# Patient Record
(demographics unavailable — no encounter records)

---

## 2024-11-11 NOTE — IMAGING
[de-identified] : RIGHT HAND superficial abrasion to volar wrist. no erythema or drainage. TTP to distal radius and ulna. non-tender to elbow. good EPL, FPL. good finger extension, flex to full fist. good finger abduction and adduction.  SILT to median, ulnar, radial distribution.  palpable radial pulse, brisk cap refill all digits. no triggering.  I independently reviewed and interpreted outside XRAYS OF RIGHT WRIST (3 views - PA, OBLIQUE, AND LATERAL VIEWS): displaced distal radius metaphyseal buckle fx with mild dorsal angulation. non-displaced distal ulnar neck fx. open physes.

## 2024-11-11 NOTE — ASSESSMENT
[FreeTextEntry1] : The condition was explained to the patient and her mother. Fracture alignment within acceptable limits. Plan to proceed with non-operative treatment.   We discussed that although there may be some imperfect alignment on X-ray, good function is more important than good X-rays, and that performing surgery may lead to unnecessary scar tissue formation. We did discuss the goals of surgery when indicated for malrotation or significant angulation/displacement and what to expect. Moreover, I anticipate that the alignment of the fracture will improve as the fracture remodels.  Risks of treatment include, but are not limited to persistent pain, stiffness, fracture displacement, post-traumatic arthritis, physeal arrest, need for future surgery, mal-union, non-union.   - I personally applied a fiberglass short arm cast. reviewed cast care instructions. Reviewed signs/symptoms of compartment syndrome that would warrant emergent evaluation, including severe swelling, worsening pain, persistent numbness/tingling that does not resolve with elevation, pale/white/cold fingers. Reviewed cast care instructions - do not remove cast, do not get cast wet, do not put objects down cast. We discussed that there is a moderate risk of complications and morbidity with casting, including skin burn, skin irritation, skin breakdown, and stiffness from immobilization. They expressed understanding. - encouraged HEP (joint blocking exercises, making a fist) 3-5x/day to reduce finger stiffness. - elevate hand above level of heart as much as possible to reduce swelling. - recommend ice and OTC pain medications such as Tylenol and NSAIDs as needed. reviewed contra-indicated medical conditions (eg liver disease, kidney disease, or GI ulcer/bleeding) or medications (eg blood thinners). Discussed possible GI and blood pressure side effects. - NWB to R hand. no gym/sports.   F/u 4wks for cast removal. X-rays of R wrist out of cast.

## 2024-11-11 NOTE — HISTORY OF PRESENT ILLNESS
[de-identified] : 11/11/24: HPI obtained from patient's parent as independent historian due to patient's age making them an unreliable historian. 7yo female (RHD) presents with mother for RIGHT wrist pain after falling off monkeybars on 11/9/24. Went to [facility] ER in [place] on DOI => XR showed fx, splint. +Tylenol. Motrin  PMH: none [FreeTextEntry5] : KAROLINA feliz [RHD] 6 year old female is here today for RIGHT wrist evaluation after falling off of the monkey bars while in [place] on 11/09/24. pt went to [facility]'s ED +xrays, splint and sling. taking Tylenol and Motrin PRN pain. denies prior injury to wrist.

## 2025-01-31 NOTE — HISTORY OF PRESENT ILLNESS
[de-identified] : fever [FreeTextEntry6] : 6 year old with fever and emesis x 1 today some cough nasal congestion sibs with FLU A Not vaccinated

## 2025-01-31 NOTE — REVIEW OF SYSTEMS
[Fever] : fever [Cough] : cough [Negative] : Genitourinary Avoid lifting with arms    NO work three days.     Follow up with your doctor if pain persists.     Take tylenol as needed.

## 2025-01-31 NOTE — DISCUSSION/SUMMARY
[FreeTextEntry1] : Child's presentation is most consistent with the flu. Therefore, I advised patient to remain home for 24 hours beyond last fever. Reviewed strict hand washing to prevent spread of the flu.  Add Tamiflu  Recommended yearly flu vaccine, discussed benefits of vaccine, side effects of the vaccine, and risks of not receiving the vaccine including the increased risk of von the illness, increased risks of secondary infections, hospitalizations, and even death.

## 2025-03-24 NOTE — HISTORY OF PRESENT ILLNESS
[de-identified] : SOR THROAT [FreeTextEntry6] : c/o sore throat and aches stuffy nose and slight cough exposed to strep and flu at school no fever sx 1 d

## 2025-03-24 NOTE — DISCUSSION/SUMMARY
[FreeTextEntry1] : RAPID STREP NEG TC SENT TO LAB  RAPID FLU NEG A NEG B  Use humidifier, saline nasal drops, encourage fluids and fever control as needed. Elevate head of bed. Return for spiking fever, worsening symptoms, respiratory distress or concerns.

## 2025-04-02 NOTE — DISCUSSION/SUMMARY
[Normal Growth] : growth [Normal Development] : development [None] : No known medical problems [No Elimination Concerns] : elimination [No Feeding Concerns] : feeding [No Skin Concerns] : skin [Normal Sleep Pattern] : sleep [No Medications] : ~He/She~ is not on any medications [Patient] : patient [School] : school [Development and Mental Health] : development and mental health [Nutrition and Physical Activity] : nutrition and physical activity [Oral Health] : oral health [Safety] : safety [Full Activity without restrictions including Physical Education & Athletics] : Full Activity without restrictions including Physical Education & Athletics [] : The components of the vaccine(s) to be administered today are listed in the plan of care. The disease(s) for which the vaccine(s) are intended to prevent and the risks have been discussed with the caretaker.  The risks are also included in the appropriate vaccination information statements which have been provided to the patient's caregiver.  The caregiver has given consent to vaccinate. [FreeTextEntry1] : FLU - DECLINED  HEARING NL  VISION SEE OPHTO  UA IN OFFICE  CBC RX GIVEN  TB risk assessment completed- no risk for TB. PPD not required  Discussed safety/feeding/sleep as appropriate for age.  Time allowed for questions and all answered with understanding.

## 2025-04-02 NOTE — HISTORY OF PRESENT ILLNESS
[Mother] : mother [Normal] : Normal [Brushing teeth twice/d] : brushing teeth twice per day [Yes] : Patient goes to dentist yearly [Vitamin] : Primary Fluoride Source: Vitamin [Playtime (60 min/d)] : playtime 60 min a day [Grade ___] : Grade [unfilled] [No] : No cigarette smoke exposure [Appropriately restrained in motor vehicle] : appropriately restrained in motor vehicle [Supervised outdoor play] : supervised outdoor play [Supervised around water] : supervised around water [Wears helmet and pads] : wears helmet and pads [Parent knows child's friends] : parent knows child's friends [Monitored computer use] : monitored computer use [Family discusses home emergency plan] : family discusses home emergency plan [Up to date] : Up to date [NO] : No [Exposure to electronic nicotine delivery system] : No exposure to electronic nicotine delivery system [FreeTextEntry7] : 8 YO WELL EXAM [de-identified] : doing well [de-identified] : good diet

## 2025-06-18 NOTE — HISTORY OF PRESENT ILLNESS
[de-identified] : rash [FreeTextEntry6] : rash noted past 24 hours started on cheeks now on arms mild fever yest no other sx

## 2025-06-18 NOTE — PHYSICAL EXAM
[NL] : moves all extremities x4, warm, well perfused x4 [de-identified] : lacy erythematous rash under skin arms thighs

## 2025-07-24 NOTE — PHYSICAL EXAM
[de-identified] : Examination of the right thumb particularly at the level of the proximal phalanx base reveals tenderness with minimal swelling, bruising, and ecchymosis.  Active flexion and extension of the right thumb intact.  NVI.

## 2025-07-24 NOTE — HISTORY OF PRESENT ILLNESS
[Sudden] : sudden [8] : 8 [2] : 2 [Constant] : constant [Student] : Work status: student [de-identified] : Brandi is a pleasant 7-year-old female who presents today with acute right thumb discomfort after suffering a direct contact fall injury while playing on a trampoline with her brother on 7/24/2025.  Subsequent to this she was seen at outside urgent care facility and now presents for follow up. [] : no [FreeTextEntry5] : pt was playing on the trampoline today and her brother fell and landed on her rt thumb.  no meds. [facility] and [facility] told her it was fractured  put her in a brace and advised to see ortho.

## 2025-07-24 NOTE — ASSESSMENT
[FreeTextEntry1] : ASSESSMENT: [The patient was accompanied today and was assisted with explaining their complaints today.] The patient comes in today with acute right thumb discomfort after suffering a direct contact fall injury while playing on a trampoline with her brother on 7/24/2025 unfortunately complicated by right thumb proximal phalanx base fracture.  This stage we have discussed the importance of proper thumb spica immobilization, nonweightbearing, strict activity modifications.  We have discussed over-the-counter anti-inflammatory medication for symptomatic relief.  We have discussed the role of rest, ice, and elevation as well. We have discussed follow-up with pediatric hand specialist this week or the following.  Patient and family member verbalized complete understanding and agreement with the plan.  The patient was adequately and thoroughly informed of my assessment of their current condition(s).  - This may diminish bodily function for the extremity.  We discussed prognosis, treatment modalities including operative and nonoperative options for the above diagnostic assessment. As always, 2nd opinion is always provided as an option. For this, when accessible, I was able to review other physicians note(s) including reviewing other tests, imaging results as well as personally view these results for my own interpretation.      The patient was adequately and thoroughly informed of my assessment of their current condition(s).   DISCUSSION: 1.  Right thumb proximal phalanx base fracture.  Commence proper immobilization, nonweightbearing, strict activity modifications. Rest, ice, elevation discussed. 2.  Thumb spica casting provided today, patient tolerated this well. 3.  Follow-up with pediatric hand specialist this week.

## 2025-07-26 NOTE — HISTORY OF PRESENT ILLNESS
[de-identified] : Age: 7 year F PMHx: none Hand Dominance: RHD Chief Complaint: Right thumb pain s/p trauma 07/24/25. Patient reports that she was jumping on a trampoline when her brother had fallen onto her, causing her to bend her right thumb backwards. Patient was seen at [facility] 07/24/25 where she had radiographs performed that showed a fracture in the right thumb (does not have with her). Patient followed up with [facility] in [place] 07/24/25 where she was casted and prompted to f/u with hand specialist. Denies numbness/tingling.  Trauma: 07/24/25 Outside Imaging/Treatment: radiographs from [facility] 07/24/25 OTC Medications: Advil and tylenol PRN with relief OT/PT: none Bracing: thumb in cast Pain worse with: movement Pain better with: rest ***Accompanied by mother***

## 2025-07-26 NOTE — ASSESSMENT
[FreeTextEntry1] : EXAM Right thumb with swelling/ecchymosis at MCP, ski intact. +ttp at MCP. Able to gently flex and extend at MCP and IP with no rotational deformity. Sensation intact at radial and ulnar pulp. <2sec cap refill.  Right thumb proximal phalanx fracture, base, minimally displaced. (3-view)  ASSESSMENT/PLAN Right thumb proximal phalanx fracture, base, minimally displaced - will manage with closed management [CPT 69170] Reviewed radiographs and pathoanatomy with patient/parent. Discussed the current alignment both radiographically and clinically are within acceptable parameters to manage nonoperatively. Will manage in coban khurram tape, ROM permitted. Elevate, minimize use. Discussed mild extension, discussed operative management can improve this alignment. Came to mutual decision to proceed with nonoperative.  Cast removed, will manage with brace.  Acute complicated injury - Risk of pain, stiffness, malunion, nonunion, rotational malalignment, post-traumatic arthrosis, and displacement requiring further intervention.  F/u 3week; repeat THUMB films

## 2025-07-26 NOTE — ASSESSMENT
[FreeTextEntry1] : EXAM Right thumb with swelling/ecchymosis at MCP, ski intact. +ttp at MCP. Able to gently flex and extend at MCP and IP with no rotational deformity. Sensation intact at radial and ulnar pulp. <2sec cap refill.  Right thumb proximal phalanx fracture, base, minimally displaced. (3-view)  ASSESSMENT/PLAN Right thumb proximal phalanx fracture, base, minimally displaced - will manage with closed management [CPT 72630] Reviewed radiographs and pathoanatomy with patient/parent. Discussed the current alignment both radiographically and clinically are within acceptable parameters to manage nonoperatively. Will manage in coban khurram tape, ROM permitted. Elevate, minimize use. Discussed mild extension, discussed operative management can improve this alignment. Came to mutual decision to proceed with nonoperative.  Cast removed, will manage with brace.  Acute complicated injury - Risk of pain, stiffness, malunion, nonunion, rotational malalignment, post-traumatic arthrosis, and displacement requiring further intervention.  F/u 3week; repeat THUMB films

## 2025-07-26 NOTE — HISTORY OF PRESENT ILLNESS
[de-identified] : Age: 7 year F PMHx: none Hand Dominance: RHD Chief Complaint: Right thumb pain s/p trauma 07/24/25. Patient reports that she was jumping on a trampoline when her brother had fallen onto her, causing her to bend her right thumb backwards. Patient was seen at [facility] 07/24/25 where she had radiographs performed that showed a fracture in the right thumb (does not have with her). Patient followed up with [facility] in [place] 07/24/25 where she was casted and prompted to f/u with hand specialist. Denies numbness/tingling.  Trauma: 07/24/25 Outside Imaging/Treatment: radiographs from [facility] 07/24/25 OTC Medications: Advil and tylenol PRN with relief OT/PT: none Bracing: thumb in cast Pain worse with: movement Pain better with: rest ***Accompanied by mother***